# Patient Record
(demographics unavailable — no encounter records)

---

## 2024-10-22 NOTE — HISTORY OF PRESENT ILLNESS
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

## 2024-10-31 NOTE — HISTORY OF PRESENT ILLNESS
[de-identified] : SOHAN WHITNEY is a 62 year old man who presents to the [facility] with left sided vocal fold paralysis and dysphonia. He is referred by Dr. Jr Senior.  Their vocal fold paralysis is thought to be caused by bacterial sinus infection treated with zpack on 9/23/24 - 2 days after starting zpack he lost his voice and began wheezing, treated with albuterol nebs Their vocal fold paralysis was first identified two years ago by Dr. Senior, but never had voice problems until 1 month ago. Voice hasn't improved, tried to not talk for 1 week.  They describe their current voice as raspy and strained They do not have issues drinking liquids. They have not worked with a speech language pathologist for this problem. They have not had prior procedures/surgeries on their vocal cords. Occupation:  for [employer] Increase SOB when speaking. Frequent throat clearing for years. Denies SOB, dysphagia, odynophagia, fevers/chills, lost 30/40lbs but was put on metformin and changed his diet within the past year.  Former smoker, quit 20 years ago, 20 pack year history Hx of alcohol abuse, asthma  CXR 9/25/24: Stated it was normal    CT neck with con performed on 1/11/22: left vocal cord paralysis, no cervical mass or adenopathy

## 2024-10-31 NOTE — ASSESSMENT
[FreeTextEntry1] :  Assessment/Plan: #1 Left vocal cord paralysis #2 Frequent throat clearing   I have recommended we proceed with a CT neck with contrast.  We will discuss these results once completed.  This will help rule out if there is a mass/cancer that is causing this vocal fold paralysis.   I have given them the following options at this time: 1) Observation 2) Voice Therapy 3) Injection laryngoplasty with Juvederm or Restylane 4) Medialization thyroplasty with silastic in the OR    The risks, benefits, and alternatives to care were discussed and understanding expressed.  The purpose of this injection is primarily to improve their voice.   The patient elected to proceed forward with option #3. Option #4 after 1 injection if does not give long lasting result.

## 2024-10-31 NOTE — PROCEDURE
[de-identified] : Stroboscopic Laryngoscopy Procedure Note: Indications: Assess laryngeal biomechanics and vocal fold oscillation. Description of Procedure: Informed consent was verbally obtained from the patient prior to the procedure. The patient was seated in the clinic chair. Topical anesthesia was achieved by first spraying the nasal cavities with 4% lidocaine and nasal decongestant. Findings: Supraglottis: no masses or lesions Glottis:  Vocal cords:                       Right: crisp and shows no lesions or masses                       Left:  crisp and shows no lesions or masses                Mobility:                       Right:  normal                       Left:  immobile, paramedian                Amplitude:                       Right:  normal                       Left:  normal                Closure: incomplete                Wave symmetry:  asymmetric Subglottis: no masses or lesions within the visualized subglottis. Visualized airway is widely patent. Other: vallecular cyst

## 2024-10-31 NOTE — HISTORY OF PRESENT ILLNESS
[de-identified] : SOHAN WHITNEY is a 62 year old man who presents to the [facility] with left sided vocal fold paralysis and dysphonia. He is referred by Dr. Jr Senior.  Their vocal fold paralysis is thought to be caused by bacterial sinus infection treated with zpack on 9/23/24 - 2 days after starting zpack he lost his voice and began wheezing, treated with albuterol nebs Their vocal fold paralysis was first identified two years ago by Dr. Senior, but never had voice problems until 1 month ago. Voice hasn't improved, tried to not talk for 1 week.  They describe their current voice as raspy and strained They do not have issues drinking liquids. They have not worked with a speech language pathologist for this problem. They have not had prior procedures/surgeries on their vocal cords. Occupation:  for [employer] Increase SOB when speaking. Frequent throat clearing for years. Denies SOB, dysphagia, odynophagia, fevers/chills, lost 30/40lbs but was put on metformin and changed his diet within the past year.  Former smoker, quit 20 years ago, 20 pack year history Hx of alcohol abuse, asthma  CXR 9/25/24: Stated it was normal    CT neck with con performed on 1/11/22: left vocal cord paralysis, no cervical mass or adenopathy

## 2024-10-31 NOTE — CONSULT LETTER
[Dear  ___] : Dear  [unfilled], [Consult Letter:] : I had the pleasure of evaluating your patient, [unfilled]. [Please see my note below.] : Please see my note below. [Consult Closing:] : Thank you very much for allowing me to participate in the care of this patient.  If you have any questions, please do not hesitate to contact me. [Sincerely,] : Sincerely, [FreeTextEntry2] :  Dr. Jr Senior [FreeTextEntry3] : Femi Turner M.D. Division of Laryngology | Department of Otolaryngology  [address] [address]

## 2024-10-31 NOTE — CONSULT LETTER
[Dear  ___] : Dear  [unfilled], [Consult Letter:] : I had the pleasure of evaluating your patient, [unfilled]. [Please see my note below.] : Please see my note below. [Consult Closing:] : Thank you very much for allowing me to participate in the care of this patient.  If you have any questions, please do not hesitate to contact me. [Sincerely,] : Sincerely, [FreeTextEntry2] :  Dr. Jr Senior [FreeTextEntry3] : Femi Turner M.D. Division of Laryngology | Department of Otolaryngology  [address] [address]

## 2024-10-31 NOTE — PROCEDURE
[de-identified] : Stroboscopic Laryngoscopy Procedure Note: Indications: Assess laryngeal biomechanics and vocal fold oscillation. Description of Procedure: Informed consent was verbally obtained from the patient prior to the procedure. The patient was seated in the clinic chair. Topical anesthesia was achieved by first spraying the nasal cavities with 4% lidocaine and nasal decongestant. Findings: Supraglottis: no masses or lesions Glottis:  Vocal cords:                       Right: crisp and shows no lesions or masses                       Left:  crisp and shows no lesions or masses                Mobility:                       Right:  normal                       Left:  immobile, paramedian                Amplitude:                       Right:  normal                       Left:  normal                Closure: incomplete                Wave symmetry:  asymmetric Subglottis: no masses or lesions within the visualized subglottis. Visualized airway is widely patent. Other: vallecular cyst

## 2024-11-18 NOTE — HISTORY OF PRESENT ILLNESS
[de-identified] :  SOHAN WHITNEY is a 62 year old man who presents to the [facility] with  left vocal cord paralysis and frequent throat clearing. Last seen 10/31/24. At that time, pt was to get CT neck and RTC for injection laryngoplasty.   CT neck with con performed on 11/6/24 shows: Unchanged left vocal cord paralysis. No cervical mass or adenopathy.  Previously reported: left sided vocal fold paralysis and dysphonia. He is referred by Dr. Jr Senior. Their vocal fold paralysis is thought to be caused by bacterial sinus infection treated with zpack on 9/23/24 - 2 days after starting zpack he lost his voice and began wheezing, treated with albuterol nebs Their vocal fold paralysis was first identified two years ago by Dr. Senior, but never had voice problems until 1 month ago. Voice hasn't improved, tried to not talk for 1 week. They describe their current voice as raspy and strained They do not have issues drinking liquids. They have not worked with a speech language pathologist for this problem. They have not had prior procedures/surgeries on their vocal cords. Occupation:  for [employer] Increase SOB when speaking. Frequent throat clearing for years. Denies SOB, dysphagia, odynophagia, fevers/chills, lost 30/40lbs but was put on metformin and changed his diet within the past year. Former smoker, quit 20 years ago, 20 pack year history Hx of alcohol abuse, asthma  CXR 9/25/24: Stated it was normal  CT neck with con performed on 1/11/22: left vocal cord paralysis, no cervical mass or adenopathy  Prior Pertinent Procedures: 11/21/24: ***

## 2024-11-18 NOTE — HISTORY OF PRESENT ILLNESS
[de-identified] :  SOHAN WHITNEY is a 62 year old man who presents to the [facility] with  left vocal cord paralysis and frequent throat clearing. Last seen 10/31/24. At that time, pt was to get CT neck and RTC for injection laryngoplasty.   CT neck with con performed on 11/6/24 shows: Unchanged left vocal cord paralysis. No cervical mass or adenopathy.  Previously reported: left sided vocal fold paralysis and dysphonia. He is referred by Dr. Jr Senior. Their vocal fold paralysis is thought to be caused by bacterial sinus infection treated with zpack on 9/23/24 - 2 days after starting zpack he lost his voice and began wheezing, treated with albuterol nebs Their vocal fold paralysis was first identified two years ago by Dr. Senior, but never had voice problems until 1 month ago. Voice hasn't improved, tried to not talk for 1 week. They describe their current voice as raspy and strained They do not have issues drinking liquids. They have not worked with a speech language pathologist for this problem. They have not had prior procedures/surgeries on their vocal cords. Occupation:  for [employer] Increase SOB when speaking. Frequent throat clearing for years. Denies SOB, dysphagia, odynophagia, fevers/chills, lost 30/40lbs but was put on metformin and changed his diet within the past year. Former smoker, quit 20 years ago, 20 pack year history Hx of alcohol abuse, asthma  CXR 9/25/24: Stated it was normal  CT neck with con performed on 1/11/22: left vocal cord paralysis, no cervical mass or adenopathy  Prior Pertinent Procedures: 11/21/24: ***

## 2025-02-24 NOTE — PROCEDURE
[de-identified] : Stroboscopic Laryngoscopy Procedure Note:  Indication:	Assess laryngeal biomechanics and vocal fold oscillation.  Description of Procedure:	Informed consent was verbally obtained from the patient prior to the procedure. The patient was seated in the clinic chair. Topical anesthesia was achieved by first spraying the nasal cavities with 4% lidocaine and nasal decongestant.   Findings:  Supraglottis: no masses or lesions  Glottis:    Structure:                        Right: crisp and shows no lesions or masses                        Left:  crisp and shows no lesions or masses                 Mobility:                        Right:  normal                        Left:  immobile, paramedian                Amplitude:                        Right:  normal                       Left:  increased                Closure: complete                 Wave symmetry:  asymmetric Subglottis: no masses or lesions within the visualized subglottis Visualized airway is widely patent.

## 2025-02-24 NOTE — PROCEDURE
[de-identified] : Stroboscopic Laryngoscopy Procedure Note:  Indication:	Assess laryngeal biomechanics and vocal fold oscillation.  Description of Procedure:	Informed consent was verbally obtained from the patient prior to the procedure. The patient was seated in the clinic chair. Topical anesthesia was achieved by first spraying the nasal cavities with 4% lidocaine and nasal decongestant.   Findings:  Supraglottis: no masses or lesions  Glottis:    Structure:                        Right: crisp and shows no lesions or masses                        Left:  crisp and shows no lesions or masses                 Mobility:                        Right:  normal                        Left:  immobile, paramedian                Amplitude:                        Right:  normal                       Left:  increased                Closure: complete                 Wave symmetry:  asymmetric Subglottis: no masses or lesions within the visualized subglottis Visualized airway is widely patent.

## 2025-02-24 NOTE — ASSESSMENT
[FreeTextEntry1] : Assessment/Plan: #1 Left vocal cord paralysis #2 Frequent throat clearing #3 Dysphonia  I have given them the following options at this time: 1) Observation 2) Voice Therapy 3) In OR direct laryngoscopy and injection laryngoplasty with Prolaryn Plus 4) In OR medialization thyroplasty   The risks, benefits, and alternatives to care were discussed and understanding expressed.  The purpose of this injection is primarily to improve their voice.   The patient elected to proceed forward with option #3.

## 2025-02-24 NOTE — HISTORY OF PRESENT ILLNESS
[de-identified] : SOHAN WHITNEY is a 62 year old man who presents to the [facility] with left vocal cord paralysis, dysphonia, and frequent throat clearing. Last seen on 11/21/24 at which time we did Restylane injection.  I wanted to see him back in 3 months in clinic. As vocal fold had been immobile for over 2 years we previously discussed next step of either repat injection with Restylane, in OR injection with Caha, or in OR medialization thyroplasty with silastic. States following injection, his voice improved for about 3 weeks, but back to baseline prior. No dysphagia but has to be more cognizant of swallowing. Getting headaches because of effort to project voice. Denies dyspnea, fevers/chills.  CT neck with con performed on 11/6/24 and reviewed by myself shows: Unchanged left vocal cord paralysis. No cervical mass or adenopathy.  Previously reported: left sided vocal fold paralysis and dysphonia. He is referred by Dr. Jr Senior. Their vocal fold paralysis is thought to be caused by bacterial sinus infection treated with zpack on 9/23/24 - 2 days after starting zpack he lost his voice and began wheezing, treated with albuterol nebs Vocal paralysis first identified 2022 but voice did not change till Sept 2024. Their vocal fold paralysis was first identified two years ago by Dr. Senior, but never had voice problems until 1 month ago. Voice hasn't improved, tried to not talk for 1 week. They describe their current voice as raspy and strained They do not have issues drinking liquids. They have not worked with a speech language pathologist for this problem. They have not had prior procedures/surgeries on their vocal cords. Occupation:  for [employer] Increase SOB when speaking. Frequent throat clearing for years. Denies SOB, dysphagia, odynophagia, fevers/chills, lost 30/40lbs but was put on metformin and changed his diet within the past year. Former smoker, quit 20 years ago, 20 pack year history Hx of alcohol abuse, asthma  CXR 9/25/24: Stated it was normal  CT neck with con performed on 1/11/22: left vocal cord paralysis, no cervical mass or adenopathy  Prior Pertinent Procedures: 11/21/24: In office left sided injection laryngoplasty with Restylane; Dr. Turner

## 2025-02-24 NOTE — HISTORY OF PRESENT ILLNESS
[de-identified] : SOHAN WHITNEY is a 62 year old man who presents to the [facility] with left vocal cord paralysis, dysphonia, and frequent throat clearing. Last seen on 11/21/24 at which time we did Restylane injection.  I wanted to see him back in 3 months in clinic. As vocal fold had been immobile for over 2 years we previously discussed next step of either repat injection with Restylane, in OR injection with Caha, or in OR medialization thyroplasty with silastic. States following injection, his voice improved for about 3 weeks, but back to baseline prior. No dysphagia but has to be more cognizant of swallowing. Getting headaches because of effort to project voice. Denies dyspnea, fevers/chills.  CT neck with con performed on 11/6/24 and reviewed by myself shows: Unchanged left vocal cord paralysis. No cervical mass or adenopathy.  Previously reported: left sided vocal fold paralysis and dysphonia. He is referred by Dr. Jr Senior. Their vocal fold paralysis is thought to be caused by bacterial sinus infection treated with zpack on 9/23/24 - 2 days after starting zpack he lost his voice and began wheezing, treated with albuterol nebs Vocal paralysis first identified 2022 but voice did not change till Sept 2024. Their vocal fold paralysis was first identified two years ago by Dr. Senior, but never had voice problems until 1 month ago. Voice hasn't improved, tried to not talk for 1 week. They describe their current voice as raspy and strained They do not have issues drinking liquids. They have not worked with a speech language pathologist for this problem. They have not had prior procedures/surgeries on their vocal cords. Occupation:  for [employer] Increase SOB when speaking. Frequent throat clearing for years. Denies SOB, dysphagia, odynophagia, fevers/chills, lost 30/40lbs but was put on metformin and changed his diet within the past year. Former smoker, quit 20 years ago, 20 pack year history Hx of alcohol abuse, asthma  CXR 9/25/24: Stated it was normal  CT neck with con performed on 1/11/22: left vocal cord paralysis, no cervical mass or adenopathy  Prior Pertinent Procedures: 11/21/24: In office left sided injection laryngoplasty with Restylane; Dr. Turner

## 2025-05-22 NOTE — HISTORY OF PRESENT ILLNESS
[de-identified] : SOHAN WHITNEY is a 63 year old man who presents to the [facility] with left vocal cord paralysis, dysphonia, and frequent throat clearing. Last seen on 4/23/25 in OR. 1st POA. States voice is improved since procedure. Globus is unchanged. No trouble swallowing or breathing.   CT neck with con performed on 11/6/24 and reviewed by myself shows: Unchanged left vocal cord paralysis. No cervical mass or adenopathy.  Previously reported: left sided vocal fold paralysis and dysphonia. He is referred by Dr. Jr Senior. Their vocal fold paralysis is thought to be caused by bacterial sinus infection treated with zpack on 9/23/24 - 2 days after starting zpack he lost his voice and began wheezing, treated with albuterol nebs Vocal paralysis first identified 2022 but voice did not change till Sept 2024. Their vocal fold paralysis was first identified two years ago by Dr. Senior, but never had voice problems until 1 month ago. Voice hasn't improved, tried to not talk for 1 week. They describe their current voice as raspy and strained They do not have issues drinking liquids. They have not worked with a speech language pathologist for this problem. They have not had prior procedures/surgeries on their vocal cords. Occupation:  for [employer] Increase SOB when speaking. Frequent throat clearing for years. Denies SOB, dysphagia, odynophagia, fevers/chills, lost 30/40lbs but was put on metformin and changed his diet within the past year. Former smoker, quit 20 years ago, 20 pack year history Hx of alcohol abuse, asthma  CXR 9/25/24: Stated it was normal  CT neck with con performed on 1/11/22: left vocal cord paralysis, no cervical mass or adenopathy  Prior Pertinent Procedures: 11/21/24: In office left sided injection laryngoplasty with Restylane; Dr. Turner 4/23/25: DL, left vocal fold injection with prolaryn plus; Dr. Turner

## 2025-05-22 NOTE — ASSESSMENT
[FreeTextEntry1] :  Assessment/Plan: #1 Left vocal cord paralysis s/p Prolaryn plus #2 Frequent throat clearing #3 Globus #4 Nasal polyposis  Patient will follow up with me as needed. Knows to call if voice worsens. Would likely want to proceed with thyroplasty if needs any further procedures. Will continue to follow up with Dr. Senior for nasal polyposis.

## 2025-05-22 NOTE — HISTORY OF PRESENT ILLNESS
[de-identified] : OSHAN WHITNEY is a 63 year old man who presents to the [facility] with left vocal cord paralysis, dysphonia, and frequent throat clearing. Last seen on 4/23/25 in OR. 1st POA. States voice is improved since procedure. Globus is unchanged. No trouble swallowing or breathing.   CT neck with con performed on 11/6/24 and reviewed by myself shows: Unchanged left vocal cord paralysis. No cervical mass or adenopathy.  Previously reported: left sided vocal fold paralysis and dysphonia. He is referred by Dr. Jr Senior. Their vocal fold paralysis is thought to be caused by bacterial sinus infection treated with zpack on 9/23/24 - 2 days after starting zpack he lost his voice and began wheezing, treated with albuterol nebs Vocal paralysis first identified 2022 but voice did not change till Sept 2024. Their vocal fold paralysis was first identified two years ago by Dr. Senior, but never had voice problems until 1 month ago. Voice hasn't improved, tried to not talk for 1 week. They describe their current voice as raspy and strained They do not have issues drinking liquids. They have not worked with a speech language pathologist for this problem. They have not had prior procedures/surgeries on their vocal cords. Occupation:  for [employer] Increase SOB when speaking. Frequent throat clearing for years. Denies SOB, dysphagia, odynophagia, fevers/chills, lost 30/40lbs but was put on metformin and changed his diet within the past year. Former smoker, quit 20 years ago, 20 pack year history Hx of alcohol abuse, asthma  CXR 9/25/24: Stated it was normal  CT neck with con performed on 1/11/22: left vocal cord paralysis, no cervical mass or adenopathy  Prior Pertinent Procedures: 11/21/24: In office left sided injection laryngoplasty with Restylane; Dr. Turner 4/23/25: DL, left vocal fold injection with prolaryn plus; Dr. Turner

## 2025-05-22 NOTE — PROCEDURE
[de-identified] : Stroboscopic Laryngoscopy Procedure Note: Indications: Assess laryngeal biomechanics and vocal fold oscillation. Description of Procedure: Informed consent was verbally obtained from the patient prior to the procedure. The patient was seated in the clinic chair. Topical anesthesia was achieved by first spraying the nasal cavities with 4% lidocaine and nasal decongestant. Findings: Supraglottis: no masses or lesions Glottis:  Vocal cords:                       Right: crisp and shows no lesions or masses                       Left:  crisp and shows no lesions or masses                Mobility:                       Right:  normal                       Left:  normal                Amplitude:                       Right:  normal                       Left:  normal                Closure: complete                Wave symmetry:  symmetric Subglottis: no masses or lesions within the visualized subglottis. Visualized airway is widely patent. Other:

## 2025-05-22 NOTE — PROCEDURE
[de-identified] : Stroboscopic Laryngoscopy Procedure Note: Indications: Assess laryngeal biomechanics and vocal fold oscillation. Description of Procedure: Informed consent was verbally obtained from the patient prior to the procedure. The patient was seated in the clinic chair. Topical anesthesia was achieved by first spraying the nasal cavities with 4% lidocaine and nasal decongestant. Findings: Supraglottis: no masses or lesions Glottis:  Vocal cords:                       Right: crisp and shows no lesions or masses                       Left:  crisp and shows no lesions or masses                Mobility:                       Right:  normal                       Left:  normal                Amplitude:                       Right:  normal                       Left:  normal                Closure: complete                Wave symmetry:  symmetric Subglottis: no masses or lesions within the visualized subglottis. Visualized airway is widely patent. Other:

## 2025-07-21 NOTE — PROCEDURE
[de-identified] : Stroboscopic Laryngoscopy Procedure Note: Indications: Assess laryngeal biomechanics and vocal fold oscillation. Description of Procedure: Informed consent was verbally obtained from the patient prior to the procedure. The patient was seated in the clinic chair. Topical anesthesia was achieved by first spraying the nasal cavities with 4% lidocaine and nasal decongestant. Findings: Supraglottis: no masses or lesions Glottis: Vocal cords:  Right: crisp and shows no lesions or masses  Left: crisp and shows no lesions or masses  Mobility:  Right: normal  Left: absent, near midline  Amplitude:  Right: normal  Left: normal  Closure: complete  Wave symmetry: symmetric Subglottis: no masses or lesions within the visualized subglottis. Visualized airway is widely patent.

## 2025-07-21 NOTE — HISTORY OF PRESENT ILLNESS
[de-identified] :  SOHAN WHITNEY is a 63 year old man who presents to the [facility] with left vocal cord paralysis s/p prolaryn plus, frequent throat clearing, globus, and nasal polyposis. Last seen on 5/22/25. He was to follow up PRN. Would consider thyroplasty if he needed any further procedures. He was to continue follow up with  Dr. Jr Senior for nasal polyps.  At the end of June was taking azelastine and flonase BID for nasal polyp, currently not taking it because started to have left sided neck pain, headache, sinus pain/pressure Finished course of doxy last night for presumed sinus infection Feels like voice is more raspy, hoarse, but other people says that his voice sounds good.  Denies dysphagia, odynophagia, dyspnea, fevers.   CT neck with con performed on 11/6/24 and reviewed by myself shows: Unchanged left vocal cord paralysis. No cervical mass or adenopathy.  Previously reported: left sided vocal fold paralysis and dysphonia. He is referred by Dr. Jr Senior. Their vocal fold paralysis is thought to be caused by bacterial sinus infection treated with zpack on 9/23/24 - 2 days after starting zpack he lost his voice and began wheezing, treated with albuterol nebs Vocal paralysis first identified 2022 but voice did not change till Sept 2024. Their vocal fold paralysis was first identified two years ago by Dr. Senior, but never had voice problems until 1 month ago. Voice hasn't improved, tried to not talk for 1 week. They describe their current voice as raspy and strained They do not have issues drinking liquids. They have not worked with a speech language pathologist for this problem. They have not had prior procedures/surgeries on their vocal cords. Occupation:  for [employer] Increase SOB when speaking. Frequent throat clearing for years. Denies SOB, dysphagia, odynophagia, fevers/chills, lost 30/40lbs but was put on metformin and changed his diet within the past year. Former smoker, quit 20 years ago, 20 pack year history Hx of alcohol abuse, asthma  CXR 9/25/24: Stated it was normal  CT neck with con performed on 1/11/22: left vocal cord paralysis, no cervical mass or adenopathy  Prior Pertinent Procedures: 11/21/24: In office left sided injection laryngoplasty with Restylane; Dr. Turner 4/23/25: DL, left vocal fold injection with prolaryn plus; Dr. Turner

## 2025-07-21 NOTE — HISTORY OF PRESENT ILLNESS
[de-identified] :  SOHAN WHITNEY is a 63 year old man who presents to the [facility] with left vocal cord paralysis s/p prolaryn plus, frequent throat clearing, globus, and nasal polyposis. Last seen on 5/22/25. He was to follow up PRN. Would consider thyroplasty if he needed any further procedures. He was to continue follow up with  Dr. Jr Senior for nasal polyps.  At the end of June was taking azelastine and flonase BID for nasal polyp, currently not taking it because started to have left sided neck pain, headache, sinus pain/pressure Finished course of doxy last night for presumed sinus infection Feels like voice is more raspy, hoarse, but other people says that his voice sounds good.  Denies dysphagia, odynophagia, dyspnea, fevers.   CT neck with con performed on 11/6/24 and reviewed by myself shows: Unchanged left vocal cord paralysis. No cervical mass or adenopathy.  Previously reported: left sided vocal fold paralysis and dysphonia. He is referred by Dr. Jr Senior. Their vocal fold paralysis is thought to be caused by bacterial sinus infection treated with zpack on 9/23/24 - 2 days after starting zpack he lost his voice and began wheezing, treated with albuterol nebs Vocal paralysis first identified 2022 but voice did not change till Sept 2024. Their vocal fold paralysis was first identified two years ago by Dr. Senior, but never had voice problems until 1 month ago. Voice hasn't improved, tried to not talk for 1 week. They describe their current voice as raspy and strained They do not have issues drinking liquids. They have not worked with a speech language pathologist for this problem. They have not had prior procedures/surgeries on their vocal cords. Occupation:  for [employer] Increase SOB when speaking. Frequent throat clearing for years. Denies SOB, dysphagia, odynophagia, fevers/chills, lost 30/40lbs but was put on metformin and changed his diet within the past year. Former smoker, quit 20 years ago, 20 pack year history Hx of alcohol abuse, asthma  CXR 9/25/24: Stated it was normal  CT neck with con performed on 1/11/22: left vocal cord paralysis, no cervical mass or adenopathy  Prior Pertinent Procedures: 11/21/24: In office left sided injection laryngoplasty with Restylane; Dr. Turner 4/23/25: DL, left vocal fold injection with prolaryn plus; Dr. Turner

## 2025-07-21 NOTE — ASSESSMENT
[FreeTextEntry1] : Assessment/Plan: #1 Left vocal cord paralysis s/p Prolaryn plus #2 Frequent throat clearing #3 Globus #4 Nasal polyposis  Will continue to follow up with Dr. Senior for nasal polyposis.  Will restart Flonase 2 puffs daily. Unclear if recent left neck pain was from nasal sprays or from infection. If restart Flonase and pain worsens/returns that answers our question and he would then stop.  Will follow up with me 6 months for voice.

## 2025-07-21 NOTE — PROCEDURE
[de-identified] : Stroboscopic Laryngoscopy Procedure Note: Indications: Assess laryngeal biomechanics and vocal fold oscillation. Description of Procedure: Informed consent was verbally obtained from the patient prior to the procedure. The patient was seated in the clinic chair. Topical anesthesia was achieved by first spraying the nasal cavities with 4% lidocaine and nasal decongestant. Findings: Supraglottis: no masses or lesions Glottis: Vocal cords:  Right: crisp and shows no lesions or masses  Left: crisp and shows no lesions or masses  Mobility:  Right: normal  Left: absent, near midline  Amplitude:  Right: normal  Left: normal  Closure: complete  Wave symmetry: symmetric Subglottis: no masses or lesions within the visualized subglottis. Visualized airway is widely patent.